# Patient Record
(demographics unavailable — no encounter records)

---

## 2018-05-30 NOTE — RAD
INDICATION: Cholecystitis     



COMPARISON: None

 

TECHNIQUE: Longitudinal and transverse scans of the right upper quadrant were obtained.

Doppler interrogation of the hepatic and portal venous system was performed.



FINDINGS:



Liver: The liver is normal in size and echogenicity. There are no focal masses. The liver

measures 15 cm in cephalocaudal dimension.



Vessels: There is normal hepatic and portal venous flow.



Bile ducts: There is no evidence of intrahepatic or extrahepatic ductal dilatation.  The

common duct measures 0.2 cm.



Gallbladder: The gallbladder is partially contracted. The patient ate less than 3 hours

prior to imaging. There is no convincing evidence of cholelithiasis, thickening of the

gallbladder wall, or pericholecystic fluid.



Pancreas: The visualized pancreas appears normal



Right kidney: The right kidney is normal in size and echogenicity. There are no masses or

calculi. There is no evidence of hydronephrosis. The right kidney measures 10.8 x 3.6 x

4.0 cm.



IVC and aorta: The aorta and superior vena cava appear normal.



Fluid: There is no ascites.



Other: None.



IMPRESSION:  PARTIALLY CONTRACTED GALLBLADDER, OTHERWISE NEGATIVE

## 2018-05-30 NOTE — ED
Virgen FLORES Julia, scribed for Matthew Ortiz on 05/30/18 at 1106 .





Abdominal Pain/Female





- HPI Summary


HPI Summary: 





This patient is a 31 year old F presenting to [facility] accompanied by male 

 with a chief complaint of left sided abdominal pain  and mid back 

pain for the past week. Rates pain as moderate. She additionally reports bright 

red blood per rectum. She denies diarrhea. Reports a hx of constipation. 





- History of Current Complaint


Chief Complaint: EDAbdPain


Stated Complaint: ABD PAIN


Time Seen by Provider: 05/30/18 10:56


Hx Obtained From: Patient


Onset/Duration: Lasting Weeks


Timing: Constant


Severity Currently: Moderate


Pain Intensity: 6


Pain Scale Used: 0-10 Numeric


Location: Other - left


Alleviating Factor(s): Nothing


Associated Signs and Symptoms: Positive: Back Pain, Constipation, Blood in Stool


Allergies/Adverse Reactions: 


 Allergies











Allergy/AdvReac Type Severity Reaction Status Date / Time


 


No Known Allergies Allergy   Verified 05/30/18 10:53











Home Medications: 


 Home Medications





Docusate CAP* [Colace Cap*] 100 mg PO DAILY PRN 05/30/18 [History Confirmed 05/ 30/18]











PMH/Surg Hx/FS Hx/Imm Hx


GI History: Reports: Other GI Disorders - constipation


EENT History: 


   Denies: Hx Deafness


Infectious Disease History: No


Infectious Disease History: 


   Denies: Traveled Outside the [place] in Last 30 Days





- Family History


Known Family History: Positive: Hypertension





- Social History


Alcohol Use: None


Substance Use Type: Reports: None


Smoking Status (MU): Never Smoked Tobacco





Review of Systems


Negative: Fever


Gastrointestinal: Other - rectal bleeding


Positive: Abdominal Pain.  Negative: Diarrhea


All Other Systems Reviewed And Are Negative: Yes





Physical Exam





- Summary


Physical Exam Summary: 





Appearance: Well appearing, no pain distress


Skin: warm, dry, reflects adequate perfusion


Head/face: normal


Eyes: EOMI, LEMUEL


ENT: normal


Neck: supple, non-tender


Respiratory: CTA, breath sounds present


Cardiovascular: RRR, pulses symmetrical  


Abdomen: diffuse tenderness, soft


Bowel: present


Musculoskeletal: normal, strength/ROM intact


Neuro: normal, sensory motor intact, A&Ox3





Triage Information Reviewed: Yes


Vital Signs On Initial Exam: 


 Initial Vitals











Temp Pulse Resp BP Pulse Ox


 


 98.7 F   97   17   124/70   100 


 


 05/30/18 10:46  05/30/18 10:46  05/30/18 10:46  05/30/18 10:46  05/30/18 10:46











Vital Signs Reviewed: Yes





Diagnostics





- Vital Signs


 Vital Signs











  Temp Pulse Resp BP Pulse Ox


 


 05/30/18 10:46  98.7 F  97  17  124/70  100














- Laboratory


Lab Results: 


 Lab Results











  05/30/18 05/30/18 05/30/18 Range/Units





  11:28 11:28 11:28 


 


WBC  6.3    (3.5-10.8)  10^3/ul


 


RBC  4.40    (4.0-5.4)  10^6/ul


 


Hgb  12.8    (12.0-16.0)  g/dl


 


Hct  38    (35-47)  %


 


MCV  86    (80-97)  fL


 


MCH  29    (27-31)  pg


 


MCHC  34    (31-36)  g/dl


 


RDW  13    (10.5-15)  %


 


Plt Count  171    (150-450)  10^3/ul


 


MPV  8.6    (7.4-10.4)  um3


 


Neut % (Auto)  56.7    (38-83)  %


 


Lymph % (Auto)  24.0 L    (25-47)  %


 


Mono % (Auto)  5.0    (0-7)  %


 


Eos % (Auto)  13.9 H    (0-6)  %


 


Baso % (Auto)  0.4    (0-2)  %


 


Absolute Neuts (auto)  3.6    (1.5-7.7)  10^3/ul


 


Absolute Lymphs (auto)  1.5    (1.0-4.8)  10^3/ul


 


Absolute Monos (auto)  0.3    (0-0.8)  10^3/ul


 


Absolute Eos (auto)  0.9 H    (0-0.6)  10^3/ul


 


Absolute Basos (auto)  0    (0-0.2)  10^3/ul


 


Absolute Nucleated RBC  0    10^3/ul


 


Nucleated RBC %  0.1    


 


INR (Anticoag Therapy)     (0.77-1.02)  


 


APTT     (26.0-36.3)  seconds


 


Sodium   139   (139-145)  mmol/L


 


Potassium   4.2   (3.5-5.0)  mmol/L


 


Chloride   108   (101-111)  mmol/L


 


Carbon Dioxide   26   (22-32)  mmol/L


 


Anion Gap   5   (2-11)  mmol/L


 


BUN   10   (6-24)  mg/dL


 


Creatinine   0.76   (0.51-0.95)  mg/dL


 


Est GFR ( Amer)   114.2   (>60)  


 


Est GFR (Non-Af Amer)   88.8   (>60)  


 


BUN/Creatinine Ratio   13.2   (8-20)  


 


Glucose   120 H   ()  mg/dL


 


Lactic Acid    1.1  (0.5-2.0)  mmol/L


 


Calcium   9.3   (8.6-10.3)  mg/dL


 


Total Bilirubin   0.40   (0.2-1.0)  mg/dL


 


AST   14   (13-39)  U/L


 


ALT   11   (7-52)  U/L


 


Alkaline Phosphatase   58   ()  U/L


 


Troponin I   0.00   (<0.04)  ng/mL


 


Total Protein   6.7   (6.4-8.9)  g/dL


 


Albumin   4.1   (3.2-5.2)  g/dL


 


Globulin   2.6   (2-4)  g/dL


 


Albumin/Globulin Ratio   1.6   (1-3)  


 


Lipase   30   (11.0-82.0)  U/L


 


Beta HCG, Quant   < 0.60   mIU/mL


 


Urine Color     


 


Urine Appearance     


 


Urine pH     (5-9)  


 


Ur Specific Gravity     (1.010-1.030)  


 


Urine Protein     (Negative)  


 


Urine Ketones     (Negative)  


 


Urine Blood     (Negative)  


 


Urine Nitrate     (Negative)  


 


Urine Bilirubin     (Negative)  


 


Urine Urobilinogen     (Negative)  


 


Ur Leukocyte Esterase     (Negative)  


 


Urine Glucose     (Negative)  














  05/30/18 05/30/18 Range/Units





  12:00 12:39 


 


WBC    (3.5-10.8)  10^3/ul


 


RBC    (4.0-5.4)  10^6/ul


 


Hgb    (12.0-16.0)  g/dl


 


Hct    (35-47)  %


 


MCV    (80-97)  fL


 


MCH    (27-31)  pg


 


MCHC    (31-36)  g/dl


 


RDW    (10.5-15)  %


 


Plt Count    (150-450)  10^3/ul


 


MPV    (7.4-10.4)  um3


 


Neut % (Auto)    (38-83)  %


 


Lymph % (Auto)    (25-47)  %


 


Mono % (Auto)    (0-7)  %


 


Eos % (Auto)    (0-6)  %


 


Baso % (Auto)    (0-2)  %


 


Absolute Neuts (auto)    (1.5-7.7)  10^3/ul


 


Absolute Lymphs (auto)    (1.0-4.8)  10^3/ul


 


Absolute Monos (auto)    (0-0.8)  10^3/ul


 


Absolute Eos (auto)    (0-0.6)  10^3/ul


 


Absolute Basos (auto)    (0-0.2)  10^3/ul


 


Absolute Nucleated RBC    10^3/ul


 


Nucleated RBC %    


 


INR (Anticoag Therapy)  1.02   (0.77-1.02)  


 


APTT  34.7   (26.0-36.3)  seconds


 


Sodium    (139-145)  mmol/L


 


Potassium    (3.5-5.0)  mmol/L


 


Chloride    (101-111)  mmol/L


 


Carbon Dioxide    (22-32)  mmol/L


 


Anion Gap    (2-11)  mmol/L


 


BUN    (6-24)  mg/dL


 


Creatinine    (0.51-0.95)  mg/dL


 


Est GFR ( Amer)    (>60)  


 


Est GFR (Non-Af Amer)    (>60)  


 


BUN/Creatinine Ratio    (8-20)  


 


Glucose    ()  mg/dL


 


Lactic Acid    (0.5-2.0)  mmol/L


 


Calcium    (8.6-10.3)  mg/dL


 


Total Bilirubin    (0.2-1.0)  mg/dL


 


AST    (13-39)  U/L


 


ALT    (7-52)  U/L


 


Alkaline Phosphatase    ()  U/L


 


Troponin I    (<0.04)  ng/mL


 


Total Protein    (6.4-8.9)  g/dL


 


Albumin    (3.2-5.2)  g/dL


 


Globulin    (2-4)  g/dL


 


Albumin/Globulin Ratio    (1-3)  


 


Lipase    (11.0-82.0)  U/L


 


Beta HCG, Quant    mIU/mL


 


Urine Color   Colorless  


 


Urine Appearance   Clear  


 


Urine pH   8.0  (5-9)  


 


Ur Specific Gravity   1.003 L  (1.010-1.030)  


 


Urine Protein   Negative  (Negative)  


 


Urine Ketones   Negative  (Negative)  


 


Urine Blood   Negative  (Negative)  


 


Urine Nitrate   Negative  (Negative)  


 


Urine Bilirubin   Negative  (Negative)  


 


Urine Urobilinogen   Negative  (Negative)  


 


Ur Leukocyte Esterase   Negative  (Negative)  


 


Urine Glucose   Negative  (Negative)  











Result Diagrams: 


 05/30/18 11:28





 05/30/18 11:28


Lab Statement: Any lab studies that have been ordered have been reviewed, and 

results considered in the medical decision making process.





- Radiology


  ** CXR


Radiology Interpretation Completed By: Radiologist - NO ACTIVE CARDIOPULMONARY 

DISEASE. ED Physician has reviewed this report.





- CT


  ** A/P CT


CT Interpretation Completed By: Radiologist - Normal appendix. No obstructive 

uropathy is noted. There is likely hemorrhagic cyst in the right ovary with a 

small amount of free fluid in the cul-de-sac. ED Physician has reviewed this 

report.





- EKG


  ** 12:05


Cardiac Rate: NL


EKG Rhythm: Sinus Rhythm - 69 BPM


EKG Interpretation: no acute changes





- Additional Comments


Diagnostic Additional Comments: 





A Gallbladder US reveals, as per radiologist: PARTIALLY CONTRACTED GALLBLADDER, 

OTHERWISE NEGATIVE. ED Physician has reviewed this report. 





Abdominal Pain Fem Course/Dx





- Course


Course Of Treatment: 31 [facility] 

 with a chief complaint of left sided abdominal pain  and mid back 

pain for the past week. Rports hx of constipation. An EKG, CXR, A/P CT, and 

Gallbladder US reveal no acute findings. Bloodwork and UA is unremarkable. 

Patient is given IV fluids and Diltiazem. Patient is informed of results and is 

discharged with a prescription for Protonix.





- Diagnoses


Differential Diagnosis: Positive: Appendicitis, Diverticulitis, Pancreatitis, 

Pregnancy, Renal Colic


Provider Diagnoses: 


 Nonspecific abdominal pain








Discharge





- Sign-Out/Discharge


Documenting (check all that apply): Discharge/Admit/Transfer





- Discharge Plan


Condition: Stable


Disposition: HOME


Prescriptions: 


Pantoprazole TAB (NF) [Protonix TAB (NF)] 40 mg PO DAILY #30 tab


Patient Education Materials:  Acute Abdominal Pain (ED)


Referrals: 


[facility] PHYSICIAN REFERRAL [Outside] - 3 Days (Contact the Physician referral 

center to set up a primary care physician. )





- Billing Disposition and Condition


Condition: STABLE


Disposition: HOME





The documentation as recorded by the Virgen duncan Julia accurately reflects 

the service I personally performed and the decisions made by Diana nagy Emmanuel.

## 2018-05-30 NOTE — RAD
HISTORY: Abdominal pain, epigastric pain



COMPARISONS: None



VIEWS: 4: Frontal dual-energy and lateral views of the chest.



FINDINGS:

CARDIOMEDIASTINAL SILHOUETTE: The cardiomediastinal silhouette is normal.

MUSTAPHA: The mustapha are normal.

PLEURA: The costophrenic angles are sharp. No pleural abnormalities are noted.

LUNG PARENCHYMA: The lungs are clear.

ABDOMEN: The upper abdomen is clear. There is no subphrenic gas.

BONES AND SOFT TISSUES: There is mild scoliotic curvature of the spine

OTHER: None.



IMPRESSION:

NO ACTIVE CARDIOPULMONARY DISEASE.

## 2018-05-30 NOTE — RAD
Indication: Abdominal tenderness.



Contrast: Administered 84.0 ml of OMNIPAQUE 300 mg/ml



CT of the abdomen and pelvis was performed after oral and IV contrast administration.

Coronal and sagittal reconstructed images were obtained.



The lung bases demonstrate no pleural fluid, nodules or masses. Heart is of normal size

without evidence of pericardial effusion.



Liver is normal in size. No focal lesions or intrahepatic ductal dilatation is noted. The

spleen is normal in size. Pancreas demonstrates no mass or pancreatic ductal dilatation.

The gallbladder demonstrates no calcified gallstones, pericholecystic fluid or wall

thickening. It is partially contracted. The common duct is not dilated.



No adrenal lesions are noted. The kidneys demonstrate symmetric nephrograms without focal

lesions. No retroperitoneal lymphadenopathy is noted.



CT of the pelvis demonstrates appendix to be visualized and appears to be air-filled.

Small bowel demonstrates no abnormal dilatation. Urinary bladder is unremarkable.



Collapsing right ovarian cyst may represent hemorrhagic cyst. There may be trace amount of

free fluid in the cul-de-sac. No hernia is noted. No other dilated loops of bowel are

noted.



IMPRESSION: Normal appendix. No obstructive uropathy is noted. There is likely hemorrhagic

cyst in the right ovary with a small amount of free fluid in the cul-de-sac.

## 2018-06-08 NOTE — ED
Scott FLORES Angela, scribed for Evert Montes MD on 06/08/18 at 0206 .





Abdominal Pain/Female





- HPI Summary


HPI Summary: 





This pt is a 32 y/[facility] since 21:00 

last night. Pt additionally reports nausea, vomiting, diarrhea. Pt states she 

also has pain in the mid back. She denies fever, chills. 


Pt denies any past abdominal surgeries. 


PMHx significant for constipation. 





- History of Current Complaint


Chief Complaint: EDAbdPain


Stated Complaint: ABD PAIN


Time Seen by Provider: 06/08/18 01:59


Hx Obtained From: Patient


Onset/Duration: Lasting Hours, Still Present


Timing: Hours


Severity Currently: Moderate


Pain Intensity: 6


Pain Scale Used: 0-10 Numeric


Location: Diffuse


Radiates: No


Aggravating Factor(s): Nothing


Alleviating Factor(s): Nothing


Associated Signs and Symptoms: Positive: Back Pain, Nausea, Vomiting, Diarrhea.

  Negative: Fever


Allergies/Adverse Reactions: 


 Allergies











Allergy/AdvReac Type Severity Reaction Status Date / Time


 


No Known Allergies Allergy   Verified 05/30/18 10:53














PMH/Surg Hx/FS Hx/Imm Hx


Endocrine/Hematology History: 


   Denies: Hx Diabetes


Cardiovascular History: 


   Denies: Hx Hypertension


GI History: Reports: Other GI Disorders - constipation


Sensory History: 


   Denies: Hx Deafness


Infectious Disease History: No


Infectious Disease History: Reports: Traveled Outside the [place] in Last 30 Days





- Family History


Known Family History: Positive: Hypertension





- Social History


Alcohol Use: None


Substance Use Type: Reports: None


Smoking Status (MU): Never Smoked Tobacco





Review of Systems


Negative: Fever, Chills


Eyes: Negative


ENT: Negative


Positive: Abdominal Pain, Vomiting, Diarrhea, Nausea


Skin: Negative


Neurological: Negative


All Other Systems Reviewed And Are Negative: Yes





Physical Exam





- Summary


Physical Exam Summary: 





VITAL SIGNS: Reviewed.


GENERAL: Patient is a well-developed and nourished female who is lying 

comfortable in the stretcher. Patient is not in any acute respiratory distress.


HEAD AND FACE: No signs of trauma. No ecchymosis, hematomas or skull 

depressions. No sinus tenderness.


EYES: PERRLA, EOMI x 2, No injected conjunctiva, no nystagmus.


EARS: Hearing grossly intact. Ear canals and tympanic membranes are within 

normal limits.


MOUTH: Oropharynx within normal limits.


NECK: Supple, trachea is midline, no adenopathy, no JVD, no carotid bruit, no c-

spine tenderness, neck with full ROM.


CHEST: Symmetric, no tenderness at palpation


LUNGS: Clear to auscultation bilaterally. No wheezing or crackles.


CVS: Regular rate and rhythm, S1 and S2 present, no murmurs or gallops 

appreciated.


ABDOMEN: Soft, non-tender. No signs of distention. No rebound no guarding, and 

no masses palpated. Bowel sounds are normal.


EXTREMITIES: FROM in all major joints, no edema, no cyanosis or clubbing.


NEURO: Alert and oriented x 3. No acute neurological deficits. Speech is normal 

and follows commands.


SKIN: Dry and warm


Triage Information Reviewed: Yes


Vital Signs On Initial Exam: 


 Initial Vitals











Temp Pulse Resp BP Pulse Ox


 


 98.2 F   80   20   120/60   100 


 


 06/08/18 01:36  06/08/18 01:36  06/08/18 01:36  06/08/18 01:36  06/08/18 01:36











Vital Signs Reviewed: Yes





Diagnostics





- Vital Signs


 Vital Signs











  Temp Pulse Resp BP Pulse Ox


 


 06/08/18 01:36  98.2 F  80  20  120/60  100














- Laboratory


Result Diagrams: 


 06/08/18 02:17





 06/08/18 02:17


Lab Statement: Any lab studies that have been ordered have been reviewed, and 

results considered in the medical decision making process.





Re-Evaluation





- Re-Evaluation


  ** First Eval


Re-Evaluation Time: 02:58


Change: Improved


Comment: Pt is feeling better. She will be discharged home.





Abdominal Pain Fem Course/Dx





- Course


Course Of Treatment: Pt is a 32 y/o female who presents with abdominal pain 

since 21:00 last night. Pt additionally reports nausea, vomiting, diarrhea. Pt 

states she also has pain in the mid back. She denies fever, chills.  Pt denies 

any past abdominal surgeries.  Test results without any significant 

abnormalities.  In the ED course the pt was given IV fluids, Toradol, Reglan, 

and Protonix. After these medications pt is feeling better. Therefore pt will 

be discharged home with follow up from her PCP. She is instructed to return to 

the ED for any worsening symptoms.





- Diagnoses


Provider Diagnoses: 


 Gastroenteritis








Discharge





- Sign-Out/Discharge


Documenting (check all that apply): Discharge/Admit/Transfer - Discharge





- Discharge Plan


Condition: Stable


Disposition: HOME


Patient Education Materials:  Gastroenteritis (ED)


Referrals: 


[facility] REFERRAL [Outside]


Additional Instructions: 


Please establish a primary care provider and follow up.





RETURN TO EMERGENCY DEPARTMENT FOR ANY NEW OR WORSENING SYMPTOMS.





The documentation as recorded by the Scott duncan Angela accurately reflects 

the service I personally performed and the decisions made by Simon nagy Abdul, MD.